# Patient Record
Sex: FEMALE | Race: OTHER | HISPANIC OR LATINO
[De-identification: names, ages, dates, MRNs, and addresses within clinical notes are randomized per-mention and may not be internally consistent; named-entity substitution may affect disease eponyms.]

---
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
## 2020-07-11 ENCOUNTER — TRANSCRIPTION ENCOUNTER (OUTPATIENT)
Age: 20
End: 2020-07-11

## 2020-09-17 ENCOUNTER — TRANSCRIPTION ENCOUNTER (OUTPATIENT)
Age: 20
End: 2020-09-17

## 2020-09-23 ENCOUNTER — TRANSCRIPTION ENCOUNTER (OUTPATIENT)
Age: 20
End: 2020-09-23

## 2020-10-04 ENCOUNTER — TRANSCRIPTION ENCOUNTER (OUTPATIENT)
Age: 20
End: 2020-10-04

## 2020-11-15 ENCOUNTER — TRANSCRIPTION ENCOUNTER (OUTPATIENT)
Age: 20
End: 2020-11-15

## 2020-11-17 ENCOUNTER — TRANSCRIPTION ENCOUNTER (OUTPATIENT)
Age: 20
End: 2020-11-17

## 2021-02-05 ENCOUNTER — TRANSCRIPTION ENCOUNTER (OUTPATIENT)
Age: 21
End: 2021-02-05

## 2021-02-08 ENCOUNTER — TRANSCRIPTION ENCOUNTER (OUTPATIENT)
Age: 21
End: 2021-02-08

## 2021-03-11 ENCOUNTER — TRANSCRIPTION ENCOUNTER (OUTPATIENT)
Age: 21
End: 2021-03-11

## 2021-07-08 ENCOUNTER — TRANSCRIPTION ENCOUNTER (OUTPATIENT)
Age: 21
End: 2021-07-08

## 2021-08-04 ENCOUNTER — TRANSCRIPTION ENCOUNTER (OUTPATIENT)
Age: 21
End: 2021-08-04

## 2021-12-30 ENCOUNTER — TRANSCRIPTION ENCOUNTER (OUTPATIENT)
Age: 21
End: 2021-12-30

## 2022-04-10 ENCOUNTER — TRANSCRIPTION ENCOUNTER (OUTPATIENT)
Age: 22
End: 2022-04-10

## 2022-04-10 ENCOUNTER — RESULT REVIEW (OUTPATIENT)
Age: 22
End: 2022-04-10

## 2023-08-14 ENCOUNTER — EMERGENCY (EMERGENCY)
Facility: HOSPITAL | Age: 23
LOS: 1 days | Discharge: HOME | End: 2023-08-14
Attending: EMERGENCY MEDICINE | Admitting: EMERGENCY MEDICINE
Payer: MEDICAID

## 2023-08-14 VITALS
RESPIRATION RATE: 16 BRPM | OXYGEN SATURATION: 99 % | TEMPERATURE: 99 F | HEART RATE: 73 BPM | SYSTOLIC BLOOD PRESSURE: 117 MMHG | DIASTOLIC BLOOD PRESSURE: 73 MMHG

## 2023-08-14 VITALS
SYSTOLIC BLOOD PRESSURE: 113 MMHG | OXYGEN SATURATION: 100 % | RESPIRATION RATE: 15 BRPM | HEART RATE: 72 BPM | DIASTOLIC BLOOD PRESSURE: 76 MMHG | TEMPERATURE: 99 F

## 2023-08-14 DIAGNOSIS — Z78.9 OTHER SPECIFIED HEALTH STATUS: Chronic | ICD-10-CM

## 2023-08-14 LAB
ALBUMIN SERPL ELPH-MCNC: 4.1 G/DL — SIGNIFICANT CHANGE UP (ref 3.3–5)
ALP SERPL-CCNC: 103 U/L — SIGNIFICANT CHANGE UP (ref 40–120)
ALT FLD-CCNC: 10 U/L — SIGNIFICANT CHANGE UP (ref 4–33)
ANION GAP SERPL CALC-SCNC: 12 MMOL/L — SIGNIFICANT CHANGE UP (ref 7–14)
APPEARANCE UR: ABNORMAL
AST SERPL-CCNC: 15 U/L — SIGNIFICANT CHANGE UP (ref 4–32)
BASOPHILS # BLD AUTO: 0.05 K/UL — SIGNIFICANT CHANGE UP (ref 0–0.2)
BASOPHILS NFR BLD AUTO: 0.6 % — SIGNIFICANT CHANGE UP (ref 0–2)
BILIRUB SERPL-MCNC: 0.2 MG/DL — SIGNIFICANT CHANGE UP (ref 0.2–1.2)
BILIRUB UR-MCNC: NEGATIVE — SIGNIFICANT CHANGE UP
BUN SERPL-MCNC: 14 MG/DL — SIGNIFICANT CHANGE UP (ref 7–23)
CALCIUM SERPL-MCNC: 8.9 MG/DL — SIGNIFICANT CHANGE UP (ref 8.4–10.5)
CHLORIDE SERPL-SCNC: 105 MMOL/L — SIGNIFICANT CHANGE UP (ref 98–107)
CO2 SERPL-SCNC: 21 MMOL/L — LOW (ref 22–31)
COLOR SPEC: YELLOW — SIGNIFICANT CHANGE UP
CREAT SERPL-MCNC: 0.54 MG/DL — SIGNIFICANT CHANGE UP (ref 0.5–1.3)
DIFF PNL FLD: NEGATIVE — SIGNIFICANT CHANGE UP
EGFR: 132 ML/MIN/1.73M2 — SIGNIFICANT CHANGE UP
EOSINOPHIL # BLD AUTO: 0.39 K/UL — SIGNIFICANT CHANGE UP (ref 0–0.5)
EOSINOPHIL NFR BLD AUTO: 4.7 % — SIGNIFICANT CHANGE UP (ref 0–6)
GLUCOSE SERPL-MCNC: 95 MG/DL — SIGNIFICANT CHANGE UP (ref 70–99)
GLUCOSE UR QL: NEGATIVE MG/DL — SIGNIFICANT CHANGE UP
HCG SERPL-ACNC: <1 MIU/ML — SIGNIFICANT CHANGE UP
HCT VFR BLD CALC: 32.2 % — LOW (ref 34.5–45)
HCT VFR BLD CALC: 33.7 % — LOW (ref 34.5–45)
HGB BLD-MCNC: 10.3 G/DL — LOW (ref 11.5–15.5)
HGB BLD-MCNC: 10.9 G/DL — LOW (ref 11.5–15.5)
IANC: 4.58 K/UL — SIGNIFICANT CHANGE UP (ref 1.8–7.4)
IMM GRANULOCYTES NFR BLD AUTO: 0.2 % — SIGNIFICANT CHANGE UP (ref 0–0.9)
KETONES UR-MCNC: NEGATIVE MG/DL — SIGNIFICANT CHANGE UP
LEUKOCYTE ESTERASE UR-ACNC: NEGATIVE — SIGNIFICANT CHANGE UP
LIDOCAIN IGE QN: 24 U/L — SIGNIFICANT CHANGE UP (ref 7–60)
LYMPHOCYTES # BLD AUTO: 2.54 K/UL — SIGNIFICANT CHANGE UP (ref 1–3.3)
LYMPHOCYTES # BLD AUTO: 30.9 % — SIGNIFICANT CHANGE UP (ref 13–44)
MCHC RBC-ENTMCNC: 26.8 PG — LOW (ref 27–34)
MCHC RBC-ENTMCNC: 27.3 PG — SIGNIFICANT CHANGE UP (ref 27–34)
MCHC RBC-ENTMCNC: 32 GM/DL — SIGNIFICANT CHANGE UP (ref 32–36)
MCHC RBC-ENTMCNC: 32.3 GM/DL — SIGNIFICANT CHANGE UP (ref 32–36)
MCV RBC AUTO: 82.8 FL — SIGNIFICANT CHANGE UP (ref 80–100)
MCV RBC AUTO: 85.4 FL — SIGNIFICANT CHANGE UP (ref 80–100)
MONOCYTES # BLD AUTO: 0.64 K/UL — SIGNIFICANT CHANGE UP (ref 0–0.9)
MONOCYTES NFR BLD AUTO: 7.8 % — SIGNIFICANT CHANGE UP (ref 2–14)
NEUTROPHILS # BLD AUTO: 4.58 K/UL — SIGNIFICANT CHANGE UP (ref 1.8–7.4)
NEUTROPHILS NFR BLD AUTO: 55.8 % — SIGNIFICANT CHANGE UP (ref 43–77)
NITRITE UR-MCNC: NEGATIVE — SIGNIFICANT CHANGE UP
NRBC # BLD: 0 /100 WBCS — SIGNIFICANT CHANGE UP (ref 0–0)
NRBC # BLD: 0 /100 WBCS — SIGNIFICANT CHANGE UP (ref 0–0)
NRBC # FLD: 0 K/UL — SIGNIFICANT CHANGE UP (ref 0–0)
NRBC # FLD: 0 K/UL — SIGNIFICANT CHANGE UP (ref 0–0)
PH UR: 7.5 — SIGNIFICANT CHANGE UP (ref 5–8)
PLATELET # BLD AUTO: 286 K/UL — SIGNIFICANT CHANGE UP (ref 150–400)
PLATELET # BLD AUTO: 337 K/UL — SIGNIFICANT CHANGE UP (ref 150–400)
POTASSIUM SERPL-MCNC: 3.9 MMOL/L — SIGNIFICANT CHANGE UP (ref 3.5–5.3)
POTASSIUM SERPL-SCNC: 3.9 MMOL/L — SIGNIFICANT CHANGE UP (ref 3.5–5.3)
PROT SERPL-MCNC: 7.1 G/DL — SIGNIFICANT CHANGE UP (ref 6–8.3)
PROT UR-MCNC: NEGATIVE MG/DL — SIGNIFICANT CHANGE UP
RBC # BLD: 3.77 M/UL — LOW (ref 3.8–5.2)
RBC # BLD: 4.07 M/UL — SIGNIFICANT CHANGE UP (ref 3.8–5.2)
RBC # FLD: 12.9 % — SIGNIFICANT CHANGE UP (ref 10.3–14.5)
RBC # FLD: 12.9 % — SIGNIFICANT CHANGE UP (ref 10.3–14.5)
SODIUM SERPL-SCNC: 138 MMOL/L — SIGNIFICANT CHANGE UP (ref 135–145)
SP GR SPEC: 1.03 — SIGNIFICANT CHANGE UP (ref 1–1.03)
UROBILINOGEN FLD QL: 1 MG/DL — SIGNIFICANT CHANGE UP (ref 0.2–1)
WBC # BLD: 11.56 K/UL — HIGH (ref 3.8–10.5)
WBC # BLD: 8.22 K/UL — SIGNIFICANT CHANGE UP (ref 3.8–10.5)
WBC # FLD AUTO: 11.56 K/UL — HIGH (ref 3.8–10.5)
WBC # FLD AUTO: 8.22 K/UL — SIGNIFICANT CHANGE UP (ref 3.8–10.5)

## 2023-08-14 PROCEDURE — 99285 EMERGENCY DEPT VISIT HI MDM: CPT

## 2023-08-14 PROCEDURE — 74177 CT ABD & PELVIS W/CONTRAST: CPT | Mod: 26,MA

## 2023-08-14 RX ORDER — KETOROLAC TROMETHAMINE 30 MG/ML
15 SYRINGE (ML) INJECTION ONCE
Refills: 0 | Status: DISCONTINUED | OUTPATIENT
Start: 2023-08-14 | End: 2023-08-14

## 2023-08-14 RX ADMIN — Medication 15 MILLIGRAM(S): at 16:21

## 2023-08-14 NOTE — ED PROVIDER NOTE - NSFOLLOWUPINSTRUCTIONS_ED_ALL_ED_FT
Follow up with Gynecology within 48-72 hrs. Show copies of your reports given to you. Take all of your medications as previously prescribed.    If you have any new, worsened or concerning symptoms, please return to the emergency department immediately. For pain control as needed: Motrin 600mg every 6hrs, alternate with Tylenol 650mg every 6hrs.     Follow up with Gynecology within 48-72 hrs. Show copies of your reports given to you. Take all of your medications as previously prescribed.    [facility]  Oncology [facility], [facility]  [address], [place] [address]  [phone]     If you have any new, worsened or concerning symptoms, please return to the emergency department immediately.

## 2023-08-14 NOTE — ED PROVIDER NOTE - OBJECTIVE STATEMENT
no past medical hx. LMP 1 month ago. Presents to the ED with 48 h of abdominal pain. Started shortly after sexual intercourse with her . Has had intermittent cramping abdominal pain since that time located in the umbilical area. Associated with abdominal bloating. No N/V/D/F/C. No vaginal bleeding or discharge no past medical hx. LMP 1 month ago. Presents to the ED with 48 h of abdominal pain. Started shortly after sexual intercourse with her . Has had intermittent cramping abdominal pain since that time located in the umbilical area. Associated with abdominal bloating. No N/V/D/F/C. No vaginal bleeding or discharge    Attendinyo female presents with abdominal pain.  started in the lower abdomen and now it is periumbilical and also in the upper abdomen.  no fever or chills.  no nausea or vomiting.  tolerating po well.

## 2023-08-14 NOTE — CONSULT NOTE ADULT - ASSESSMENT
Patient is a 23yo G0 LMP 1mo ago presenting with 2 day history of acute onset sharp abdominal pain, worsened by movement. On evaluation in the ED, patient's vital signs wnl, labs significant for mild anemia, patient well appearing and clinically stable. Has only required one dose of Toradol 15mg for pain control. CTAP significant for 3cm partially collapsed R ovarian cyst with likely hemoperitoneum in pelvis, suggestive of ruptured ovarian cyst.    - Recommend STAT CBC to trend H/H  - Patient to be reevaluated with attending after STAT CBC results  - Additional recs to follow    Discussed with GYN service attending, Dr. Yoni Ramírez PGY2 Patient is a 21yo G0 LMP 1mo ago presenting with 2 day history of acute onset sharp abdominal pain, worsened by movement. On evaluation in the ED, patient's vital signs wnl, labs significant for mild anemia, patient well appearing and clinically stable. Has only required one dose of Toradol 15mg for pain control. CTAP significant for 3cm partially collapsed R ovarian cyst with likely hemoperitoneum in pelvis, suggestive of ruptured ovarian cyst.    - Recommend STAT CBC to trend H/H  - Patient to be reevaluated with attending after STAT CBC results  - Additional recs to follow    Discussed with GYN service attending, Dr. Yoni Ramírez PGY2    **Please include the following information in patient's discharge note for follow up**    [facility]  Oncology Building, [facility]  [address] [place] [address]  [phone]  Patient is a 21yo G0 LMP 1mo ago presenting with 2 day history of acute onset sharp abdominal pain, worsened by movement. On evaluation in the ED, patient's vital signs wnl, labs significant for mild anemia, patient well appearing and clinically stable. Has only required one dose of Toradol 15mg for pain control. CTAP significant for 3cm partially collapsed R ovarian cyst with likely hemoperitoneum in pelvis, suggestive of ruptured ovarian cyst.    - Recommend STAT CBC to trend H/H  - Patient to be reevaluated with attending after STAT CBC results  - Additional recs to follow    Discussed with GYN service attending, Dr. Yoni Ramírez PGY2    **830pm update**    - STAT CBC stable, Hgb 10.3->10.9  - Patient reevaluated at bedside with attending, Dr. Vallejo. Pt with mild tenderness to palpation but no rebound tenderness  - No acute GYN interventions at this time  - Please recommend OTC medications for pain control as needed: Motrin 600mg q6hrs, alternate with Tylenol 650mg q6hrs. Abdominal discomfort as fluid resorbs is expected and may require OTC pain medications at home  - Pt recommended to establish GYN care at [facility], please see information below and include in patient's discharge paperwork  - Pt cleared for discharge to home from GYN perspective, rest of care per primary ED team    Patient seen at bedside with GYN service attending, Dr. Yoni Ramírez PGY2    [facility]  Oncology Building, [place] [address]  [phone]  Patient is a 21yo G0 LMP 1mo ago presenting with 2 day history of acute onset sharp abdominal pain, worsened by movement. On evaluation in the ED, patient's vital signs wnl, labs significant for mild anemia, patient well appearing and clinically stable. Has only required one dose of Toradol 15mg for pain control. CTAP significant for 3cm partially collapsed R ovarian cyst with likely hemoperitoneum in pelvis, suggestive of ruptured ovarian cyst.    - Recommend STAT CBC to trend H/H  - Patient to be reevaluated with attending after STAT CBC results  - Additional recs to follow    Discussed with GYN service attending, Dr. Yoni Ramírez PGY2    **830pm update**    - STAT CBC stable, Hgb 10.3->10.9  - Patient reevaluated at bedside with attending, Dr. Vallejo. Pt with mild tenderness to palpation but no rebound tenderness  - No acute GYN interventions at this time  - Please recommend OTC medications for pain control as needed: Motrin 600mg q6hrs, alternate with Tylenol 650mg q6hrs. Abdominal discomfort as fluid resorbs is expected and may require OTC pain medications at home  - Pt recommended to establish GYN care at [facility], please see information below and include in patient's discharge paperwork  - Pt cleared for discharge to home from GYN perspective, rest of care per primary ED team    Patient seen at bedside with GYN service attending, Dr. Yoni Ramírez PGY2    [facility]  Oncology Building, [place] [address]  [phone]     21y/o G0 LMP 1mo ago c/o abdominal pain, worsened by movement. CTAP significant for 3cm partially collapsed R ovarian cyst with likely hemoperitoneum in pelvis, suggestive of ruptured ovarian cyst.  Pt. clinically stable, to f/u as an out patient.  Rolando Vallejo M.D.

## 2023-08-14 NOTE — ED PROVIDER NOTE - CLINICAL SUMMARY MEDICAL DECISION MAKING FREE TEXT BOX
22F presents to the ED with abdominal pain    Vital signs are within normal limits    Exam notable for sharath-umbilical abdominal tenderness    UPT negative     Assessment and Impression   Concern for early appendicitis vs diverticulitis - will obtain CTAP for further eval. Exam without CMT or pelvic TTP, thus ovarian torsion clinically ruled out   If above negative and patient has symptomatic improvement, will dc with PMD follow-up

## 2023-08-14 NOTE — CONSULT NOTE ADULT - SUBJECTIVE AND OBJECTIVE BOX
ELVIN ELDRIDGE  22y  Female [id number]    HPI:        Name of GYN Physician:   OBHx:    GynHx: Denies fibroids, cysts, endometriosis, STI's, Abnormal pap smears   PMH:  PSH:  Meds:  Allx:  Social History:  Denies smoking use, drug use, alcohol use.   +occasional social alcohol use    Vital Signs Last 24 Hrs  T(C): 37.2 (14 Aug 2023 13:38), Max: 37.2 (14 Aug 2023 13:38)  T(F): 98.9 (14 Aug 2023 13:38), Max: 98.9 (14 Aug 2023 13:38)  HR: 72 (14 Aug 2023 13:38) (72 - 72)  BP: 113/76 (14 Aug 2023 13:38) (113/76 - 113/76)  RR: 15 (14 Aug 2023 13:38) (15 - 15)  SpO2: 100% (14 Aug 2023 13:38) (100% - 100%)    Parameters below as of 14 Aug 2023 13:38  Patient On (Oxygen Delivery Method): room air    Physical Exam:   General: sitting comfortably in bed, NAD   HEENT: neck supple, full ROM  CV: RRR  Lungs: CTA b/l, good air flow b/l   Back: No CVA tenderness  Abd: Soft, non-tender, non-distended.  Bowel sounds present.    :  No bleeding on pad.    External labia wnl.  Bimanual exam with no cervical motion tenderness, uterus wnl, adnexa non palpable b/l.  Cervix closed vs. Cervix dilated  Speculum Exam: No active bleeding from os.  Physiologic discharge.    Ext: non-tender b/l, no edema     LABS:                        10.3   8.22  )-----------( 286      ( 14 Aug 2023 15:29 )             32.2     08-14    138  |  105  |  14  ----------------------------<  95  3.9   |  21<L>  |  0.54    Ca    8.9      14 Aug 2023 15:29    TPro  7.1  /  Alb  4.1  /  TBili  0.2  /  DBili  x   /  AST  15  /  ALT  10  /  AlkPhos  103  08-14    Urinalysis Basic - ( 14 Aug 2023 16:40 )    Color: Yellow / Appearance: Cloudy / S.026 / pH: x  Gluc: x / Ketone: Negative mg/dL  / Bili: Negative / Urobili: 1.0 mg/dL   Blood: x / Protein: Negative mg/dL / Nitrite: Negative   Leuk Esterase: Negative / RBC: 3 /HPF / WBC 1 /HPF   Sq Epi: x / Non Sq Epi: 2 /HPF / Bacteria: Negative /HPF    RADIOLOGY & ADDITIONAL STUDIES:  < from: CT Abdomen and Pelvis w/ IV Cont (23 @ 17:51) >    ACC: [id number] EXAM:  CT ABDOMEN AND PELVIS IC   ORDERED BY: JOSELUIS STOCK   PROCEDURE DATE:  2023      INTERPRETATION:  CLINICAL INFORMATION: Periumbilical abdominal pain for 2   days. Negative serum beta hCG level.    COMPARISON: None.    CONTRAST/COMPLICATIONS:  IV Contrast: Omnipaque 350  90 cc administered   10 cc discarded  Oral Contrast: NONE  Complications: None reported at time of study completion    PROCEDURE:  CT of the Abdomen and Pelvis was performed.  Sagittal and coronal reformats were performed.    FINDINGS:  LOWER CHEST: Within normal limits.    LIVER: Within normal limits.  BILE DUCTS: Normal caliber.  GALLBLADDER: Within normal limits.  SPLEEN: Within normal limits.  PANCREAS: Within normal limits.  ADRENALS: Within normal limits.  KIDNEYS/URETERS: Within normal limits.    BLADDER: Within normal limits.  REPRODUCTIVE ORGANS: Arcuate uterus. 3 cm partially collapsed right   adnexal cyst.    BOWEL: No bowel obstruction. Appendix is normal.  PERITONEUM: High attenuation fluid in the mid pelvis consistent with   hemoperitoneum.  VESSELS: Within normal limits.  RETROPERITONEUM/LYMPH NODES: No lymphadenopathy.  ABDOMINAL WALL: Within normal limits.  BONES: Within normal limits.    IMPRESSION:  Pelvic hemoperitoneum likely secondary to ruptured hemorrhagic right   ovarian cyst. Consider pelvic sonogram for further characterization.    --- End of Report ---    DEOMND TROTTER MD; Attending Radiologist  This document has been electronically signed. Aug 14 2023  6:07PM    < end of copied text >       ELVIN ELDRIDGE  22y  Female [id number]    HPI: Patient is a 23yo G0 LMP 1mo ago presenting for 2 day history of abdominal pain and bloating. Patient reports acute onset sharp pain Saturday after intercourse with . Pain exacerbated by movement, laughing, coughing, walking. Pain is 10/10 at its worst and 5-6/10 on average. She has only required pain medications once at home, when 10/10 pain woke her up last night, she took Motrin with relief and was able to fall back asleep. Reports associated bloating, denies nausea, vomiting, lightheadedness, dizziness, syncope, SOB, palpitations.    Name of GYN Physician: none, has seen an unknown GYN 2-3 years ago  OBHx: G0  GynHx: Irregular menses, Denies fibroids, known cysts, endometriosis, STI's, Abnormal pap smears   PMH: none  PSH: R ear tympanoplasty  Meds: allergy injections (allergy to multiple animals, insects, etc)  Allx: NKDA  Social History:  Denies smoking use, drug use, alcohol use.   +occasional social alcohol use    Vital Signs Last 24 Hrs  T(C): 37.2 (14 Aug 2023 13:38), Max: 37.2 (14 Aug 2023 13:38)  T(F): 98.9 (14 Aug 2023 13:38), Max: 98.9 (14 Aug 2023 13:38)  HR: 72 (14 Aug 2023 13:38) (72 - 72)  BP: 113/76 (14 Aug 2023 13:38) (113/76 - 113/76)  RR: 15 (14 Aug 2023 13:38) (15 - 15)  SpO2: 100% (14 Aug 2023 13:38) (100% - 100%)    Parameters below as of 14 Aug 2023 13:38  Patient On (Oxygen Delivery Method): room air    Physical Exam:   General: sitting comfortably in bed, NAD   HEENT: neck supple, full ROM  CV: RRR  Lungs: normal respiratory effort on room air  Abd: Soft, non-distended, mild diffuse tenderness to palpation. Mild guarding with rebound tenderness.    : External labia wnl. Bimanual exam with no cervical motion tenderness, uterus wnl, adnexa non palpable b/l with diffuse tenderness to lower pelvis.  Speculum Exam: not indicated, deferred  Ext: non-tender b/l, no edema     LABS:                        10.3   8.22  )-----------( 286      ( 14 Aug 2023 15:29 )             32.2         138  |  105  |  14  ----------------------------<  95  3.9   |  21<L>  |  0.54    Ca    8.9      14 Aug 2023 15:29    TPro  7.1  /  Alb  4.1  /  TBili  0.2  /  DBili  x   /  AST  15  /  ALT  10  /  AlkPhos  103      Urinalysis Basic - ( 14 Aug 2023 16:40 )    Color: Yellow / Appearance: Cloudy / S.026 / pH: x  Gluc: x / Ketone: Negative mg/dL  / Bili: Negative / Urobili: 1.0 mg/dL   Blood: x / Protein: Negative mg/dL / Nitrite: Negative   Leuk Esterase: Negative / RBC: 3 /HPF / WBC 1 /HPF   Sq Epi: x / Non Sq Epi: 2 /HPF / Bacteria: Negative /HPF    RADIOLOGY & ADDITIONAL STUDIES:  < from: CT Abdomen and Pelvis w/ IV Cont (23 @ 17:51) >    ACC: [id number] EXAM:  CT ABDOMEN AND PELVIS IC   ORDERED BY: JOSELUIS STOCK   PROCEDURE DATE:  2023      INTERPRETATION:  CLINICAL INFORMATION: Periumbilical abdominal pain for 2   days. Negative serum beta hCG level.    COMPARISON: None.    CONTRAST/COMPLICATIONS:  IV Contrast: Omnipaque 350  90 cc administered   10 cc discarded  Oral Contrast: NONE  Complications: None reported at time of study completion    PROCEDURE:  CT of the Abdomen and Pelvis was performed.  Sagittal and coronal reformats were performed.    FINDINGS:  LOWER CHEST: Within normal limits.    LIVER: Within normal limits.  BILE DUCTS: Normal caliber.  GALLBLADDER: Within normal limits.  SPLEEN: Within normal limits.  PANCREAS: Within normal limits.  ADRENALS: Within normal limits.  KIDNEYS/URETERS: Within normal limits.    BLADDER: Within normal limits.  REPRODUCTIVE ORGANS: Arcuate uterus. 3 cm partially collapsed right   adnexal cyst.    BOWEL: No bowel obstruction. Appendix is normal.  PERITONEUM: High attenuation fluid in the mid pelvis consistent with   hemoperitoneum.  VESSELS: Within normal limits.  RETROPERITONEUM/LYMPH NODES: No lymphadenopathy.  ABDOMINAL WALL: Within normal limits.  BONES: Within normal limits.    IMPRESSION:  Pelvic hemoperitoneum likely secondary to ruptured hemorrhagic right   ovarian cyst. Consider pelvic sonogram for further characterization.    --- End of Report ---    DEMOND TROTTER MD; Attending Radiologist  This document has been electronically signed. Aug 14 2023  6:07PM    < end of copied text >

## 2023-08-14 NOTE — ED PROVIDER NOTE - PROGRESS NOTE DETAILS
INGRID Monroy: H/H stable. Seen by GYN and cleared for discharge home. Outpatient follow up. Strict return precautions.

## 2023-08-14 NOTE — ED PROVIDER NOTE - PATIENT PORTAL LINK FT
You can access the FollowMyHealth Patient Portal offered by [facility] by registering at the following website: [email or web address]. By joining [facility]’s FollowMyHealth portal, you will also be able to view your health information using other applications (apps) compatible with our system.

## 2023-08-14 NOTE — ED PROVIDER NOTE - PHYSICAL EXAMINATION
CONSTITUTIONAL: Well appearing, awake, alert  ENMT: Airway patent, tolerating secretions.    NECK: Supple. No JVD  EYES: Clear bilaterally, pupils equal, round  CARDIAC: Warm, well-perfused  RESPIRATORY: Equal Chest Rise, no stridor. No respiratory distress.   GASTROINTESTINAL: Non-distended. + sharath-umbilical abdominal TTP  MUSCULOSKELETAL: No gross joint deformity   NEUROLOGICAL: Alert. Speech Fluent. Attends to conversation and exam  SKIN:  No erythema    PSYCHIATRIC: Normal affect. Cooperative with history and exam  Pelvic: with RN Eugenia Craig . normal appearing external genetelia. cervical os closed, no bleeding or discharged. No CMT

## 2023-08-14 NOTE — ED PROVIDER NOTE - NS ED ROS FT
CONSTITUTIONAL: no fever or chills. No weakness    Eyes: No visual change  · ENMT: no runny nose or sore throat  · CARDIOVASCULAR: no chest pain   · RESPIRATORY:  no cough or  shortness of breath.  · GASTROINTESTINAL: + abdominal pain , no nausea,  no diarrhea, no vomiting  · GENITOURINARY: no dysuria or increased urinary frequency  · MUSCULOSKELETAL: no back pain    NEURO: No weakness, no numbness/tingling   · ROS STATEMENT: all other ROS negative except as per HPI

## 2023-09-06 PROBLEM — Z78.9 OTHER SPECIFIED HEALTH STATUS: Chronic | Status: ACTIVE | Noted: 2023-08-14

## 2023-11-07 ENCOUNTER — APPOINTMENT (OUTPATIENT)
Dept: ENDOCRINOLOGY | Facility: CLINIC | Age: 23
End: 2023-11-07